# Patient Record
Sex: MALE | Race: WHITE
[De-identification: names, ages, dates, MRNs, and addresses within clinical notes are randomized per-mention and may not be internally consistent; named-entity substitution may affect disease eponyms.]

---

## 2017-01-24 ENCOUNTER — HOSPITAL ENCOUNTER (OUTPATIENT)
Age: 62
Discharge: HOME | End: 2017-01-24
Payer: COMMERCIAL

## 2017-01-24 DIAGNOSIS — J43.9: Primary | ICD-10-CM

## 2017-07-28 ENCOUNTER — HOSPITAL ENCOUNTER (OUTPATIENT)
Dept: HOSPITAL 76 - LAB | Age: 62
Discharge: HOME | End: 2017-07-28
Attending: FAMILY MEDICINE
Payer: COMMERCIAL

## 2017-07-28 DIAGNOSIS — J43.9: Primary | ICD-10-CM

## 2017-07-28 LAB
ANION GAP SERPL CALCULATED.4IONS-SCNC: 10 MMOL/L (ref 6–13)
BUN SERPL-MCNC: 21 MG/DL (ref 6–20)
CALCIUM UR-MCNC: 9.1 MG/DL (ref 8.5–10.3)
CHLORIDE SERPL-SCNC: 106 MMOL/L (ref 101–111)
CO2 SERPL-SCNC: 21 MMOL/L (ref 21–32)
CREAT SERPLBLD-SCNC: 0.9 MG/DL (ref 0.6–1.2)
GFRSERPLBLD MDRD-ARVRAT: 86 ML/MIN/{1.73_M2} (ref 89–?)
GLUCOSE SERPL-MCNC: 125 MG/DL (ref 70–100)
POTASSIUM SERPL-SCNC: 3.9 MMOL/L (ref 3.5–5)
SODIUM SERPLBLD-SCNC: 137 MMOL/L (ref 135–145)

## 2017-07-28 PROCEDURE — 80048 BASIC METABOLIC PNL TOTAL CA: CPT

## 2017-07-28 PROCEDURE — 36415 COLL VENOUS BLD VENIPUNCTURE: CPT

## 2017-07-28 PROCEDURE — 71250 CT THORAX DX C-: CPT

## 2017-07-28 NOTE — CT REPORT
CT OF THE CHEST WITHOUT CONTRAST: 07/28/2017

 

CLINICAL INDICATION: Pulmonary nodule. 

 

TECHNIQUE: Axial CT images of the chest were obtained without intravenous contrast. 

 

COMPARISON: 06/18/2016. 

 

FINDINGS:  The heart and great vessels demonstrate atherosclerotic calcifications. No hilar or medias
tinal lymphadenopathy is present. The previously noted nodule in the anterior left lower lobe has res
olved. No new pulmonary nodule or mass lesion is appreciated. No effusion or pneumothorax is present.
 The osseous structures demonstrate degenerative changes. Limited evaluation of upper abdominal struc
tures demonstrates normal adrenal glands. 

 

IMPRESSION:  EMPHYSEMA. RESOLUTION OF PREVIOUSLY NOTED LEFT LOWER LOBE NODULE. 

 

In accordance with CT protocol optimization, one or more of the following dose reduction techniques w
ere utilized for this exam:  automated exposure control, adjustment of mA and/or KV based on patient 
size, or use of iterative reconstructive technique. 

 

 

 

DD:07/28/2017 15:46:11  DT: 07/28/2017 15:58  JOB #: [id number]  EXT JOB #:[id number]

## 2019-02-06 ENCOUNTER — HOSPITAL ENCOUNTER (OUTPATIENT)
Dept: HOSPITAL 76 - LAB.WCP | Age: 64
Discharge: HOME | End: 2019-02-06
Attending: FAMILY MEDICINE
Payer: COMMERCIAL

## 2019-02-06 DIAGNOSIS — I10: Primary | ICD-10-CM

## 2019-02-06 DIAGNOSIS — E78.5: ICD-10-CM

## 2019-02-06 DIAGNOSIS — Z12.5: ICD-10-CM

## 2019-02-06 LAB
ALBUMIN DIAFP-MCNC: 4.9 G/DL (ref 3.2–5.5)
ALBUMIN/GLOB SERPL: 1.5 {RATIO} (ref 1–2.2)
ALP SERPL-CCNC: 53 IU/L (ref 42–121)
ALT SERPL W P-5'-P-CCNC: 20 IU/L (ref 10–60)
ANION GAP SERPL CALCULATED.4IONS-SCNC: 15 MMOL/L (ref 6–13)
AST SERPL W P-5'-P-CCNC: 23 IU/L (ref 10–42)
BASOPHILS NFR BLD AUTO: 0.1 10^3/UL (ref 0–0.1)
BASOPHILS NFR BLD AUTO: 0.7 %
BILIRUB BLD-MCNC: 0.9 MG/DL (ref 0.2–1)
BUN SERPL-MCNC: 26 MG/DL (ref 6–20)
CALCIUM UR-MCNC: 9.4 MG/DL (ref 8.5–10.3)
CHLORIDE SERPL-SCNC: 100 MMOL/L (ref 101–111)
CHOLEST SERPL-MCNC: 270 MG/DL
CO2 SERPL-SCNC: 21 MMOL/L (ref 21–32)
CREAT SERPLBLD-SCNC: 0.8 MG/DL (ref 0.6–1.2)
EOSINOPHIL # BLD AUTO: 0.2 10^3/UL (ref 0–0.7)
EOSINOPHIL NFR BLD AUTO: 2.5 %
ERYTHROCYTE [DISTWIDTH] IN BLOOD BY AUTOMATED COUNT: 13.4 % (ref 12–15)
GFRSERPLBLD MDRD-ARVRAT: 97 ML/MIN/{1.73_M2} (ref 89–?)
GLOBULIN SER-MCNC: 3.2 G/DL (ref 2.1–4.2)
GLUCOSE SERPL-MCNC: 97 MG/DL (ref 70–100)
HDLC SERPL-MCNC: 43 MG/DL
HDLC SERPL: 6.3 {RATIO} (ref ?–5)
HGB UR QL STRIP: 16.2 G/DL (ref 14–18)
LDLC SERPL CALC-MCNC: 150 MG/DL
LDLC/HDLC SERPL: 3.5 {RATIO} (ref ?–3.6)
LYMPHOCYTES # SPEC AUTO: 1.7 10^3/UL (ref 1.5–3.5)
LYMPHOCYTES NFR BLD AUTO: 22.1 %
MCH RBC QN AUTO: 31 PG (ref 27–31)
MCHC RBC AUTO-ENTMCNC: 34.9 G/DL (ref 32–36)
MCV RBC AUTO: 88.8 FL (ref 80–94)
MONOCYTES # BLD AUTO: 0.8 10^3/UL (ref 0–1)
MONOCYTES NFR BLD AUTO: 10.8 %
NEUTROPHILS # BLD AUTO: 5 10^3/UL (ref 1.5–6.6)
NEUTROPHILS # SNV AUTO: 7.8 X10^3/UL (ref 4.8–10.8)
NEUTROPHILS NFR BLD AUTO: 63.9 %
PDW BLD AUTO: 6.9 FL (ref 7.4–11.4)
PLATELET # BLD: 282 10^3/UL (ref 130–450)
PROT SPEC-MCNC: 8.2 G/DL (ref 6.7–8.2)
RBC MAR: 5.24 10^6/UL (ref 4.7–6.1)
SODIUM SERPLBLD-SCNC: 136 MMOL/L (ref 135–145)
VLDLC SERPL-SCNC: 77 MG/DL

## 2019-02-06 PROCEDURE — 84153 ASSAY OF PSA TOTAL: CPT

## 2019-02-06 PROCEDURE — 80061 LIPID PANEL: CPT

## 2019-02-06 PROCEDURE — 83721 ASSAY OF BLOOD LIPOPROTEIN: CPT

## 2019-02-06 PROCEDURE — 36415 COLL VENOUS BLD VENIPUNCTURE: CPT

## 2019-02-06 PROCEDURE — 80053 COMPREHEN METABOLIC PANEL: CPT

## 2019-02-06 PROCEDURE — 85025 COMPLETE CBC W/AUTO DIFF WBC: CPT

## 2019-02-06 PROCEDURE — 84443 ASSAY THYROID STIM HORMONE: CPT

## 2020-10-09 ENCOUNTER — HOSPITAL ENCOUNTER (OUTPATIENT)
Dept: HOSPITAL 76 - LAB.WCP | Age: 65
Discharge: HOME | End: 2020-10-09
Attending: FAMILY MEDICINE
Payer: MEDICARE

## 2020-10-09 DIAGNOSIS — Z12.5: ICD-10-CM

## 2020-10-09 DIAGNOSIS — I10: Primary | ICD-10-CM

## 2020-10-09 DIAGNOSIS — E78.5: ICD-10-CM

## 2020-10-09 LAB
ALBUMIN DIAFP-MCNC: 4.9 G/DL (ref 3.2–5.5)
ALBUMIN/GLOB SERPL: 1.6 {RATIO} (ref 1–2.2)
ALP SERPL-CCNC: 51 IU/L (ref 42–121)
ALT SERPL W P-5'-P-CCNC: 23 IU/L (ref 10–60)
ANION GAP SERPL CALCULATED.4IONS-SCNC: 11 MMOL/L (ref 6–13)
AST SERPL W P-5'-P-CCNC: 25 IU/L (ref 10–42)
BASOPHILS NFR BLD AUTO: 0 10^3/UL (ref 0–0.1)
BASOPHILS NFR BLD AUTO: 0.5 %
BILIRUB BLD-MCNC: 0.7 MG/DL (ref 0.2–1)
BUN SERPL-MCNC: 18 MG/DL (ref 6–20)
CALCIUM UR-MCNC: 9.6 MG/DL (ref 8.5–10.3)
CHLORIDE SERPL-SCNC: 105 MMOL/L (ref 101–111)
CHOLEST SERPL-MCNC: 216 MG/DL
CO2 SERPL-SCNC: 22 MMOL/L (ref 21–32)
CREAT SERPLBLD-SCNC: 0.7 MG/DL (ref 0.6–1.2)
EOSINOPHIL # BLD AUTO: 0.3 10^3/UL (ref 0–0.7)
EOSINOPHIL NFR BLD AUTO: 3.5 %
ERYTHROCYTE [DISTWIDTH] IN BLOOD BY AUTOMATED COUNT: 13 % (ref 12–15)
GLOBULIN SER-MCNC: 3 G/DL (ref 2.1–4.2)
GLUCOSE SERPL-MCNC: 106 MG/DL (ref 70–100)
HDLC SERPL-MCNC: 46 MG/DL
HDLC SERPL: 4.7 {RATIO} (ref ?–5)
HGB UR QL STRIP: 14.9 G/DL (ref 14–18)
LDLC SERPL CALC-MCNC: 106 MG/DL
LDLC/HDLC SERPL: 2.3 {RATIO} (ref ?–3.6)
LYMPHOCYTES # SPEC AUTO: 1.5 10^3/UL (ref 1.5–3.5)
LYMPHOCYTES NFR BLD AUTO: 20.5 %
MCH RBC QN AUTO: 31 PG (ref 27–31)
MCHC RBC AUTO-ENTMCNC: 34 G/DL (ref 32–36)
MCV RBC AUTO: 91.3 FL (ref 80–94)
MONOCYTES # BLD AUTO: 0.6 10^3/UL (ref 0–1)
MONOCYTES NFR BLD AUTO: 8.6 %
NEUTROPHILS # BLD AUTO: 4.9 10^3/UL (ref 1.5–6.6)
NEUTROPHILS # SNV AUTO: 7.4 X10^3/UL (ref 4.8–10.8)
NEUTROPHILS NFR BLD AUTO: 66.4 %
PDW BLD AUTO: 8.7 FL (ref 7.4–11.4)
PLATELET # BLD: 243 10^3/UL (ref 130–450)
PROT SPEC-MCNC: 7.9 G/DL (ref 6.7–8.2)
RBC MAR: 4.8 10^6/UL (ref 4.7–6.1)
SODIUM SERPLBLD-SCNC: 138 MMOL/L (ref 135–145)
VLDLC SERPL-SCNC: 64 MG/DL

## 2020-10-09 PROCEDURE — 80061 LIPID PANEL: CPT

## 2020-10-09 PROCEDURE — 84443 ASSAY THYROID STIM HORMONE: CPT

## 2020-10-09 PROCEDURE — 36415 COLL VENOUS BLD VENIPUNCTURE: CPT

## 2020-10-09 PROCEDURE — 85025 COMPLETE CBC W/AUTO DIFF WBC: CPT

## 2020-10-09 PROCEDURE — 80053 COMPREHEN METABOLIC PANEL: CPT

## 2020-10-09 PROCEDURE — 84153 ASSAY OF PSA TOTAL: CPT

## 2020-10-09 PROCEDURE — 83721 ASSAY OF BLOOD LIPOPROTEIN: CPT

## 2021-03-26 ENCOUNTER — HOSPITAL ENCOUNTER (OUTPATIENT)
Dept: HOSPITAL 76 - LAB.WCP | Age: 66
Discharge: HOME | End: 2021-03-26
Attending: FAMILY MEDICINE
Payer: MEDICARE

## 2021-03-26 DIAGNOSIS — E78.5: ICD-10-CM

## 2021-03-26 DIAGNOSIS — I10: Primary | ICD-10-CM

## 2021-03-26 LAB
ALBUMIN DIAFP-MCNC: 4.8 G/DL (ref 3.2–5.5)
ALBUMIN/GLOB SERPL: 1.8 {RATIO} (ref 1–2.2)
ALP SERPL-CCNC: 45 IU/L (ref 42–121)
ALT SERPL W P-5'-P-CCNC: 22 IU/L (ref 10–60)
ANION GAP SERPL CALCULATED.4IONS-SCNC: 10 MMOL/L (ref 6–13)
AST SERPL W P-5'-P-CCNC: 24 IU/L (ref 10–42)
BASOPHILS NFR BLD AUTO: 0 10^3/UL (ref 0–0.1)
BASOPHILS NFR BLD AUTO: 0.6 %
BILIRUB BLD-MCNC: 0.7 MG/DL (ref 0.2–1)
BUN SERPL-MCNC: 15 MG/DL (ref 6–20)
CALCIUM UR-MCNC: 9.7 MG/DL (ref 8.5–10.3)
CHLORIDE SERPL-SCNC: 103 MMOL/L (ref 101–111)
CHOLEST SERPL-MCNC: 224 MG/DL
CO2 SERPL-SCNC: 25 MMOL/L (ref 21–32)
CREAT SERPLBLD-SCNC: 0.8 MG/DL (ref 0.6–1.2)
EOSINOPHIL # BLD AUTO: 0.3 10^3/UL (ref 0–0.7)
EOSINOPHIL NFR BLD AUTO: 4.4 %
ERYTHROCYTE [DISTWIDTH] IN BLOOD BY AUTOMATED COUNT: 13.2 % (ref 12–15)
GFRSERPLBLD MDRD-ARVRAT: 97 ML/MIN/{1.73_M2} (ref 89–?)
GLOBULIN SER-MCNC: 2.7 G/DL (ref 2.1–4.2)
GLUCOSE SERPL-MCNC: 91 MG/DL (ref 70–100)
HCT VFR BLD AUTO: 45.3 % (ref 42–52)
HDLC SERPL-MCNC: 42 MG/DL
HDLC SERPL: 5.3 {RATIO} (ref ?–5)
HGB UR QL STRIP: 15.4 G/DL (ref 14–18)
LDLC SERPL CALC-MCNC: 117 MG/DL
LDLC/HDLC SERPL: 2.8 {RATIO} (ref ?–3.6)
LYMPHOCYTES # SPEC AUTO: 1.7 10^3/UL (ref 1.5–3.5)
LYMPHOCYTES NFR BLD AUTO: 25.5 %
MCH RBC QN AUTO: 31 PG (ref 27–31)
MCHC RBC AUTO-ENTMCNC: 34 G/DL (ref 32–36)
MCV RBC AUTO: 91.3 FL (ref 80–94)
MONOCYTES # BLD AUTO: 0.8 10^3/UL (ref 0–1)
MONOCYTES NFR BLD AUTO: 12.2 %
NEUTROPHILS # BLD AUTO: 3.7 10^3/UL (ref 1.5–6.6)
NEUTROPHILS # SNV AUTO: 6.6 X10^3/UL (ref 4.8–10.8)
NEUTROPHILS NFR BLD AUTO: 57 %
NRBC # BLD AUTO: 0 /100WBC
NRBC # BLD AUTO: 0 X10^3/UL
PDW BLD AUTO: 8.9 FL (ref 7.4–11.4)
PLATELET # BLD: 228 10^3/UL (ref 130–450)
POTASSIUM SERPL-SCNC: 4.1 MMOL/L (ref 3.5–5)
PROT SPEC-MCNC: 7.5 G/DL (ref 6.7–8.2)
RBC MAR: 4.96 10^6/UL (ref 4.7–6.1)
SODIUM SERPLBLD-SCNC: 138 MMOL/L (ref 135–145)
TRIGL P FAST SERPL-MCNC: 325 MG/DL
TSH SERPL-ACNC: 1.77 UIU/ML (ref 0.34–5.6)
VLDLC SERPL-SCNC: 65 MG/DL

## 2021-03-26 PROCEDURE — 80053 COMPREHEN METABOLIC PANEL: CPT

## 2021-03-26 PROCEDURE — 36415 COLL VENOUS BLD VENIPUNCTURE: CPT

## 2021-03-26 PROCEDURE — 84443 ASSAY THYROID STIM HORMONE: CPT

## 2021-03-26 PROCEDURE — 85025 COMPLETE CBC W/AUTO DIFF WBC: CPT

## 2021-03-26 PROCEDURE — 84153 ASSAY OF PSA TOTAL: CPT

## 2021-03-26 PROCEDURE — 80061 LIPID PANEL: CPT

## 2021-03-26 PROCEDURE — 83721 ASSAY OF BLOOD LIPOPROTEIN: CPT

## 2021-04-07 ENCOUNTER — HOSPITAL ENCOUNTER (OUTPATIENT)
Dept: HOSPITAL 76 - DI | Age: 66
Discharge: HOME | End: 2021-04-07
Attending: FAMILY MEDICINE
Payer: MEDICARE

## 2021-04-07 DIAGNOSIS — M19.011: Primary | ICD-10-CM

## 2021-04-07 NOTE — XRAY REPORT
PROCEDURE:  Shoulder 2 View RT

 

INDICATIONS:  SHOULDER PAIN, RT

 

TECHNIQUE:  2 views of the shoulder were acquired.  

 

COMPARISON:  None.

 

FINDINGS:  

 

Bones:  No acute fractures or dislocations. There are degenerative changes of the right acromioclavic
ular joint as well as the glenohumeral joint. There is undersurface irregularity involving the distal
 acromion and distal clavicle. No suspicious bony lesions.  Visualized ribs appear intact.  

 

Soft tissues:  No suspicious soft tissue calcifications.  

 

IMPRESSION:  Right shoulder without acute fracture or dislocation. Degenerative changes of the right 
shoulder.

 

Reviewed by: Brien Botello MD on 4/7/2021 4:04 PM PDT

Approved by: Brien Botello MD on 4/7/2021 4:04 PM PDT

 

 

Station ID:  [id number]

## 2021-04-22 ENCOUNTER — HOSPITAL ENCOUNTER (OUTPATIENT)
Dept: HOSPITAL 76 - DI | Age: 66
Discharge: HOME | End: 2021-04-22
Attending: FAMILY MEDICINE
Payer: MEDICARE

## 2021-04-22 DIAGNOSIS — Z87.891: ICD-10-CM

## 2021-04-22 DIAGNOSIS — Z12.2: Primary | ICD-10-CM

## 2021-04-22 DIAGNOSIS — I25.10: ICD-10-CM

## 2021-04-22 DIAGNOSIS — J43.9: ICD-10-CM

## 2021-08-12 ENCOUNTER — HOSPITAL ENCOUNTER (OUTPATIENT)
Dept: HOSPITAL 76 - COV | Age: 66
Discharge: HOME | End: 2021-08-12
Attending: ORTHOPAEDIC SURGERY
Payer: MEDICARE

## 2021-08-12 DIAGNOSIS — U07.1: Primary | ICD-10-CM

## 2022-05-17 ENCOUNTER — HOSPITAL ENCOUNTER (OUTPATIENT)
Dept: HOSPITAL 76 - LAB | Age: 67
Discharge: HOME | End: 2022-05-17
Attending: FAMILY MEDICINE
Payer: MEDICARE

## 2022-05-17 DIAGNOSIS — I10: Primary | ICD-10-CM

## 2022-05-17 DIAGNOSIS — E78.5: ICD-10-CM

## 2022-05-17 DIAGNOSIS — Z12.5: ICD-10-CM

## 2022-05-17 LAB
ALBUMIN DIAFP-MCNC: 4.7 G/DL (ref 3.2–5.5)
ALBUMIN/GLOB SERPL: 1.4 {RATIO} (ref 1–2.2)
ALP SERPL-CCNC: 43 IU/L (ref 42–121)
ALT SERPL W P-5'-P-CCNC: 23 IU/L (ref 10–60)
ANION GAP SERPL CALCULATED.4IONS-SCNC: 11 MMOL/L (ref 6–13)
AST SERPL W P-5'-P-CCNC: 23 IU/L (ref 10–42)
BASOPHILS NFR BLD AUTO: 0.1 10^3/UL (ref 0–0.1)
BASOPHILS NFR BLD AUTO: 0.7 %
BILIRUB BLD-MCNC: 0.8 MG/DL (ref 0.2–1)
BUN SERPL-MCNC: 16 MG/DL (ref 6–20)
CALCIUM UR-MCNC: 9.3 MG/DL (ref 8.5–10.3)
CHLORIDE SERPL-SCNC: 107 MMOL/L (ref 101–111)
CHOLEST SERPL-MCNC: 188 MG/DL
CO2 SERPL-SCNC: 21 MMOL/L (ref 21–32)
CREAT SERPLBLD-SCNC: 0.9 MG/DL (ref 0.6–1.2)
EOSINOPHIL # BLD AUTO: 0.4 10^3/UL (ref 0–0.7)
EOSINOPHIL NFR BLD AUTO: 5.1 %
ERYTHROCYTE [DISTWIDTH] IN BLOOD BY AUTOMATED COUNT: 12.6 % (ref 12–15)
GFRSERPLBLD MDRD-ARVRAT: 84 ML/MIN/{1.73_M2} (ref 89–?)
GLOBULIN SER-MCNC: 3.3 G/DL (ref 2.1–4.2)
GLUCOSE SERPL-MCNC: 102 MG/DL (ref 70–100)
HCT VFR BLD AUTO: 42.3 % (ref 42–52)
HDLC SERPL-MCNC: 45 MG/DL
HDLC SERPL: 4.2 {RATIO} (ref ?–5)
HGB UR QL STRIP: 15.2 G/DL (ref 14–18)
LDLC SERPL CALC-MCNC: 91 MG/DL
LDLC/HDLC SERPL: 2 {RATIO} (ref ?–3.6)
LYMPHOCYTES # SPEC AUTO: 2.2 10^3/UL (ref 1.5–3.5)
LYMPHOCYTES NFR BLD AUTO: 30.7 %
MCH RBC QN AUTO: 32.1 PG (ref 27–31)
MCHC RBC AUTO-ENTMCNC: 35.9 G/DL (ref 32–36)
MCV RBC AUTO: 89.2 FL (ref 80–94)
MONOCYTES # BLD AUTO: 0.7 10^3/UL (ref 0–1)
MONOCYTES NFR BLD AUTO: 9.8 %
NEUTROPHILS # BLD AUTO: 3.8 10^3/UL (ref 1.5–6.6)
NEUTROPHILS # SNV AUTO: 7.1 X10^3/UL (ref 4.8–10.8)
NEUTROPHILS NFR BLD AUTO: 53.4 %
NRBC # BLD AUTO: 0 /100WBC
NRBC # BLD AUTO: 0 X10^3/UL
PDW BLD AUTO: 8.6 FL (ref 7.4–11.4)
PLATELET # BLD: 222 10^3/UL (ref 130–450)
POTASSIUM SERPL-SCNC: 3.7 MMOL/L (ref 3.5–5)
PROT SPEC-MCNC: 8 G/DL (ref 6.7–8.2)
RBC MAR: 4.74 10^6/UL (ref 4.7–6.1)
SODIUM SERPLBLD-SCNC: 139 MMOL/L (ref 135–145)
TRIGL P FAST SERPL-MCNC: 260 MG/DL
TSH SERPL-ACNC: 2.17 UIU/ML (ref 0.34–5.6)
VLDLC SERPL-SCNC: 52 MG/DL

## 2022-05-17 PROCEDURE — 80053 COMPREHEN METABOLIC PANEL: CPT

## 2022-05-17 PROCEDURE — 84153 ASSAY OF PSA TOTAL: CPT

## 2022-05-17 PROCEDURE — 36415 COLL VENOUS BLD VENIPUNCTURE: CPT

## 2022-05-17 PROCEDURE — 85025 COMPLETE CBC W/AUTO DIFF WBC: CPT

## 2022-05-17 PROCEDURE — 84443 ASSAY THYROID STIM HORMONE: CPT

## 2022-05-17 PROCEDURE — 80061 LIPID PANEL: CPT

## 2022-05-17 PROCEDURE — 83721 ASSAY OF BLOOD LIPOPROTEIN: CPT

## 2022-06-25 NOTE — CT REPORT
PROCEDURE:  Low Dose Lung Cancer Screen

 

INDICATIONS:  HX OF SMOKING

 

TECHNIQUE:  

Noncontrast low-dose 5 mm thick sections acquired from the pulmonary apices to the posterior costophr
enic angles.  7 mm thick coronal and sagittal MIP reformats were then acquired.  For radiation dose r
eduction, the following was used:  automated exposure control, adjustment of mA and/or kV according t
o patient size.

 

COMPARISON:  None.

 

FINDINGS:  

Image quality:  Excellent.  

 

Lungs and pleura:  Scattered subsegmental scarring/atelectasis. No acute consolidation. Bullous emphy
sema present in the left lung base. Upper lobe predominant centrilobular emphysema.

 

Mediastinum:  Heart size is normal. Coronary artery calcifications are noted.   No pericardial effusi
on.  No mediastinal adenopathy by size criteria.  Thoracic aorta and central pulmonary arteries are n
ormal in size.  Esophagus is normal in caliber.  No hiatal hernia.  

 

Bones and chest wall:  No suspicious bony lesions.  No vertebral body compression fractures.  No axil
kacey or supraclavicular adenopathy by size criteria.  The thyroid is normal in size.  

 

Abdomen:  Visualized upper abdomen solid organs and bowel loops appear normal in the absence of contr
ast.  

 

IMPRESSION:  

Diffuse emphysema.

 

Mild coronary artery atherosclerosis.

 

No suspicious pulmonary nodule. Lung RADS 1. Recommend screening lung CT in 12 months.

 

 

Reviewed by: Jovany Conley MD on 4/22/2021 10:37 AM PDT

Approved by: Jovany Conley MD on 4/22/2021 10:37 AM PDT

 

 

Station ID:  [id number]

## 2023-03-21 ENCOUNTER — HOSPITAL ENCOUNTER (OUTPATIENT)
Dept: HOSPITAL 76 - LAB | Age: 68
Discharge: HOME | End: 2023-03-21
Attending: PHYSICIAN ASSISTANT
Payer: MEDICARE

## 2023-03-21 DIAGNOSIS — E78.5: ICD-10-CM

## 2023-03-21 DIAGNOSIS — I10: Primary | ICD-10-CM

## 2023-03-21 LAB
ALBUMIN DIAFP-MCNC: 4.3 G/DL (ref 3.2–5.5)
ALBUMIN/GLOB SERPL: 1.4 {RATIO} (ref 1–2.2)
ALP SERPL-CCNC: 43 IU/L (ref 42–121)
ALT SERPL W P-5'-P-CCNC: 20 IU/L (ref 10–60)
ANION GAP SERPL CALCULATED.4IONS-SCNC: 11 MMOL/L (ref 6–13)
AST SERPL W P-5'-P-CCNC: 21 IU/L (ref 10–42)
BASOPHILS NFR BLD AUTO: 0.1 10^3/UL (ref 0–0.1)
BASOPHILS NFR BLD AUTO: 0.9 %
BILIRUB BLD-MCNC: 0.5 MG/DL (ref 0.2–1)
BUN SERPL-MCNC: 18 MG/DL (ref 6–20)
CALCIUM UR-MCNC: 8.9 MG/DL (ref 8.5–10.3)
CHLORIDE SERPL-SCNC: 104 MMOL/L (ref 101–111)
CHOLEST SERPL-MCNC: 183 MG/DL
CO2 SERPL-SCNC: 24 MMOL/L (ref 21–32)
CREAT SERPLBLD-SCNC: 0.8 MG/DL (ref 0.6–1.2)
EOSINOPHIL # BLD AUTO: 0.3 10^3/UL (ref 0–0.7)
EOSINOPHIL NFR BLD AUTO: 4 %
ERYTHROCYTE [DISTWIDTH] IN BLOOD BY AUTOMATED COUNT: 12.6 % (ref 12–15)
GFRSERPLBLD MDRD-ARVRAT: 96 ML/MIN/{1.73_M2} (ref 89–?)
GLOBULIN SER-MCNC: 3.1 G/DL (ref 2.1–4.2)
GLUCOSE SERPL-MCNC: 114 MG/DL (ref 70–100)
HCT VFR BLD AUTO: 43.9 % (ref 42–52)
HDLC SERPL-MCNC: 43 MG/DL
HDLC SERPL: 4.3 {RATIO} (ref ?–5)
HGB UR QL STRIP: 15.2 G/DL (ref 14–18)
LDLC SERPL CALC-MCNC: 88 MG/DL
LDLC/HDLC SERPL: 2 {RATIO} (ref ?–3.6)
LYMPHOCYTES # SPEC AUTO: 2.3 10^3/UL (ref 1.5–3.5)
LYMPHOCYTES NFR BLD AUTO: 33 %
MCH RBC QN AUTO: 30.8 PG (ref 27–31)
MCHC RBC AUTO-ENTMCNC: 34.6 G/DL (ref 32–36)
MCV RBC AUTO: 89 FL (ref 80–94)
MONOCYTES # BLD AUTO: 0.7 10^3/UL (ref 0–1)
MONOCYTES NFR BLD AUTO: 9.5 %
NEUTROPHILS # BLD AUTO: 3.6 10^3/UL (ref 1.5–6.6)
NEUTROPHILS # SNV AUTO: 7 X10^3/UL (ref 4.8–10.8)
NEUTROPHILS NFR BLD AUTO: 52.3 %
NRBC # BLD AUTO: 0 /100WBC
NRBC # BLD AUTO: 0 X10^3/UL
PDW BLD AUTO: 8.6 FL (ref 7.4–11.4)
PLATELET # BLD: 246 10^3/UL (ref 130–450)
POTASSIUM SERPL-SCNC: 3.7 MMOL/L (ref 3.5–5)
PROT SPEC-MCNC: 7.4 G/DL (ref 6.7–8.2)
RBC MAR: 4.93 10^6/UL (ref 4.7–6.1)
SODIUM SERPLBLD-SCNC: 139 MMOL/L (ref 135–145)
TRIGL P FAST SERPL-MCNC: 258 MG/DL
VLDLC SERPL-SCNC: 52 MG/DL

## 2023-03-21 PROCEDURE — 80053 COMPREHEN METABOLIC PANEL: CPT

## 2023-03-21 PROCEDURE — 83721 ASSAY OF BLOOD LIPOPROTEIN: CPT

## 2023-03-21 PROCEDURE — 80061 LIPID PANEL: CPT

## 2023-03-21 PROCEDURE — 36415 COLL VENOUS BLD VENIPUNCTURE: CPT

## 2023-03-21 PROCEDURE — 85025 COMPLETE CBC W/AUTO DIFF WBC: CPT

## 2023-09-18 ENCOUNTER — HOSPITAL ENCOUNTER (OUTPATIENT)
Dept: HOSPITAL 76 - LAB | Age: 68
Discharge: HOME | End: 2023-09-18
Attending: PHYSICIAN ASSISTANT
Payer: MEDICARE

## 2023-09-18 DIAGNOSIS — R73.01: ICD-10-CM

## 2023-09-18 DIAGNOSIS — E78.5: Primary | ICD-10-CM

## 2023-09-18 DIAGNOSIS — Z12.5: ICD-10-CM

## 2023-09-18 LAB
CHOLEST SERPL-MCNC: 182 MG/DL
EST. AVERAGE GLUCOSE BLD GHB EST-MCNC: 88 MG/DL (ref 70–100)
HBA1C MFR BLD HPLC: 4.7 % (ref 4.27–6.07)
HDLC SERPL-MCNC: 41 MG/DL
HDLC SERPL: 4.4 {RATIO} (ref ?–5)
LDLC SERPL CALC-MCNC: 75 MG/DL
LDLC/HDLC SERPL: 1.8 {RATIO} (ref ?–3.6)
TRIGL P FAST SERPL-MCNC: 328 MG/DL (ref 48–352)
VLDLC SERPL-SCNC: 66 MG/DL

## 2023-09-18 PROCEDURE — 83036 HEMOGLOBIN GLYCOSYLATED A1C: CPT

## 2023-09-18 PROCEDURE — 36415 COLL VENOUS BLD VENIPUNCTURE: CPT

## 2023-09-18 PROCEDURE — 80061 LIPID PANEL: CPT

## 2023-09-18 PROCEDURE — 83721 ASSAY OF BLOOD LIPOPROTEIN: CPT

## 2023-09-18 PROCEDURE — 84153 ASSAY OF PSA TOTAL: CPT

## 2024-09-18 ENCOUNTER — HOSPITAL ENCOUNTER (OUTPATIENT)
Dept: HOSPITAL 76 - LAB | Age: 69
Discharge: HOME | End: 2024-09-18
Attending: PHYSICIAN ASSISTANT
Payer: MEDICARE

## 2024-09-18 DIAGNOSIS — E78.5: ICD-10-CM

## 2024-09-18 DIAGNOSIS — I10: Primary | ICD-10-CM

## 2024-09-18 DIAGNOSIS — Z12.5: ICD-10-CM

## 2024-09-18 LAB
ALBUMIN DIAFP-MCNC: 4.7 G/DL (ref 3.2–5.5)
ALBUMIN/GLOB SERPL: 1.9 {RATIO} (ref 1–2.2)
ALP SERPL-CCNC: 45 IU/L (ref 42–121)
ALT SERPL W P-5'-P-CCNC: 13 IU/L (ref 10–60)
ANION GAP SERPL CALCULATED.4IONS-SCNC: 7 MMOL/L (ref 6–13)
AST SERPL W P-5'-P-CCNC: 17 IU/L (ref 10–42)
BASOPHILS NFR BLD AUTO: 0.1 10^3/UL (ref 0–0.1)
BASOPHILS NFR BLD AUTO: 0.8 %
BILIRUB BLD-MCNC: 0.8 MG/DL (ref 0.2–1)
BUN SERPL-MCNC: 11 MG/DL (ref 6–20)
CALCIUM UR-MCNC: 9.6 MG/DL (ref 8.5–10.3)
CHLORIDE SERPL-SCNC: 103 MMOL/L (ref 101–111)
CHOLEST SERPL-MCNC: 180 MG/DL
CO2 SERPL-SCNC: 26 MMOL/L (ref 21–32)
CREAT SERPLBLD-SCNC: 0.9 MG/DL (ref 0.6–1.3)
EOSINOPHIL # BLD AUTO: 0.2 10^3/UL (ref 0–0.7)
EOSINOPHIL NFR BLD AUTO: 3 %
ERYTHROCYTE [DISTWIDTH] IN BLOOD BY AUTOMATED COUNT: 12.7 % (ref 12–15)
GFRSERPLBLD MDRD-ARVRAT: 83 ML/MIN/{1.73_M2} (ref 89–?)
GLOBULIN SER-MCNC: 2.5 G/DL (ref 2.1–4.2)
GLUCOSE SERPL-MCNC: 105 MG/DL (ref 74–104)
HCT VFR BLD AUTO: 43 % (ref 42–52)
HDLC SERPL-MCNC: 42 MG/DL
HDLC SERPL: 4.3 {RATIO} (ref ?–5)
HGB UR QL STRIP: 14.8 G/DL (ref 14–18)
LDLC SERPL CALC-MCNC: 77 MG/DL
LDLC/HDLC SERPL: 1.8 {RATIO} (ref ?–3.6)
LYMPHOCYTES # SPEC AUTO: 1.8 10^3/UL (ref 1.5–3.5)
LYMPHOCYTES NFR BLD AUTO: 27.6 %
MCH RBC QN AUTO: 31.4 PG (ref 27–31)
MCHC RBC AUTO-ENTMCNC: 34.4 G/DL (ref 32–36)
MCV RBC AUTO: 91.1 FL (ref 80–94)
MONOCYTES # BLD AUTO: 0.6 10^3/UL (ref 0–1)
MONOCYTES NFR BLD AUTO: 9.7 %
NEUTROPHILS # BLD AUTO: 3.9 10^3/UL (ref 1.5–6.6)
NEUTROPHILS # SNV AUTO: 6.6 X10^3/UL (ref 4.8–10.8)
NEUTROPHILS NFR BLD AUTO: 58.7 %
NRBC # BLD AUTO: 0 /100WBC
NRBC # BLD AUTO: 0 X10^3/UL
PDW BLD AUTO: 8.7 FL (ref 7.4–11.4)
PLATELET # BLD: 220 10^3/UL (ref 130–450)
POTASSIUM SERPL-SCNC: 3.6 MMOL/L (ref 3.5–4.5)
PROT SPEC-MCNC: 7.2 G/DL (ref 6.4–8.9)
RBC MAR: 4.72 10^6/UL (ref 4.7–6.1)
SODIUM SERPLBLD-SCNC: 136 MMOL/L (ref 135–145)
TRIGL P FAST SERPL-MCNC: 303 MG/DL
VLDLC SERPL-SCNC: 61 MG/DL

## 2024-09-18 PROCEDURE — 83721 ASSAY OF BLOOD LIPOPROTEIN: CPT

## 2024-09-18 PROCEDURE — 84153 ASSAY OF PSA TOTAL: CPT

## 2024-09-18 PROCEDURE — 36415 COLL VENOUS BLD VENIPUNCTURE: CPT

## 2024-09-18 PROCEDURE — 80061 LIPID PANEL: CPT

## 2024-09-18 PROCEDURE — 80053 COMPREHEN METABOLIC PANEL: CPT

## 2024-09-18 PROCEDURE — 85025 COMPLETE CBC W/AUTO DIFF WBC: CPT
